# Patient Record
Sex: MALE | Race: WHITE | NOT HISPANIC OR LATINO | Employment: PART TIME | URBAN - METROPOLITAN AREA
[De-identification: names, ages, dates, MRNs, and addresses within clinical notes are randomized per-mention and may not be internally consistent; named-entity substitution may affect disease eponyms.]

---

## 2017-02-27 ENCOUNTER — TELEPHONE (OUTPATIENT)
Dept: OTHER | Facility: CLINIC | Age: 27
End: 2017-02-27

## 2017-02-28 NOTE — TELEPHONE ENCOUNTER
Call Regarding Onboarding [facility]  Attempt 1    Message on voicemail     Comments:       Outreach   Mimi Isidro

## 2017-05-25 NOTE — TELEPHONE ENCOUNTER
5/25/2017    Call Regarding Onboarding [facility] Choices    Attempt 2    Message on voicemail     Comments:       Outreach   KV

## 2017-06-06 NOTE — TELEPHONE ENCOUNTER
6/6/2017    Call Regarding Onboarding are Choices    Attempt 3    Message on voicemail     Comments: 0 dep      Outreach   CC

## 2022-04-19 ENCOUNTER — APPOINTMENT (OUTPATIENT)
Dept: GENERAL RADIOLOGY | Facility: CLINIC | Age: 32
End: 2022-04-19
Attending: EMERGENCY MEDICINE
Payer: COMMERCIAL

## 2022-04-19 ENCOUNTER — HOSPITAL ENCOUNTER (OUTPATIENT)
Facility: CLINIC | Age: 32
Setting detail: OBSERVATION
Discharge: HOME | End: 2022-04-19
Attending: EMERGENCY MEDICINE | Admitting: INTERNAL MEDICINE
Payer: COMMERCIAL

## 2022-04-19 ENCOUNTER — APPOINTMENT (OUTPATIENT)
Dept: CT IMAGING | Facility: CLINIC | Age: 32
End: 2022-04-19
Attending: EMERGENCY MEDICINE
Payer: COMMERCIAL

## 2022-04-19 VITALS
HEIGHT: 67 IN | DIASTOLIC BLOOD PRESSURE: 105 MMHG | BODY MASS INDEX: 24.3 KG/M2 | RESPIRATION RATE: 20 BRPM | HEART RATE: 88 BPM | WEIGHT: 154.8 LBS | SYSTOLIC BLOOD PRESSURE: 135 MMHG | TEMPERATURE: 97.5 F | OXYGEN SATURATION: 97 %

## 2022-04-19 DIAGNOSIS — R06.02 SHORTNESS OF BREATH: ICD-10-CM

## 2022-04-19 DIAGNOSIS — J98.8 NARROWING OF AIRWAY: ICD-10-CM

## 2022-04-19 LAB
ANION GAP SERPL CALCULATED.3IONS-SCNC: 6 MMOL/L (ref 3–14)
BASOPHILS # BLD AUTO: 0.1 10E3/UL (ref 0–0.2)
BASOPHILS NFR BLD AUTO: 2 %
BUN SERPL-MCNC: 8 MG/DL (ref 7–30)
CALCIUM SERPL-MCNC: 7.9 MG/DL (ref 8.5–10.1)
CHLORIDE BLD-SCNC: 112 MMOL/L (ref 94–109)
CO2 SERPL-SCNC: 25 MMOL/L (ref 20–32)
CREAT SERPL-MCNC: 0.8 MG/DL (ref 0.66–1.25)
EOSINOPHIL # BLD AUTO: 0.3 10E3/UL (ref 0–0.7)
EOSINOPHIL NFR BLD AUTO: 8 %
ERYTHROCYTE [DISTWIDTH] IN BLOOD BY AUTOMATED COUNT: 12.8 % (ref 10–15)
GFR SERPL CREATININE-BSD FRML MDRD: >90 ML/MIN/1.73M2
GLUCOSE BLD-MCNC: 97 MG/DL (ref 70–99)
HCT VFR BLD AUTO: 44.9 % (ref 40–53)
HGB BLD-MCNC: 15.2 G/DL (ref 13.3–17.7)
IMM GRANULOCYTES # BLD: 0 10E3/UL
IMM GRANULOCYTES NFR BLD: 1 %
LYMPHOCYTES # BLD AUTO: 0.9 10E3/UL (ref 0.8–5.3)
LYMPHOCYTES NFR BLD AUTO: 26 %
MCH RBC QN AUTO: 32.4 PG (ref 26.5–33)
MCHC RBC AUTO-ENTMCNC: 33.9 G/DL (ref 31.5–36.5)
MCV RBC AUTO: 96 FL (ref 78–100)
MONOCYTES # BLD AUTO: 0.5 10E3/UL (ref 0–1.3)
MONOCYTES NFR BLD AUTO: 14 %
NEUTROPHILS # BLD AUTO: 1.6 10E3/UL (ref 1.6–8.3)
NEUTROPHILS NFR BLD AUTO: 49 %
NRBC # BLD AUTO: 0 10E3/UL
NRBC BLD AUTO-RTO: 0 /100
PLATELET # BLD AUTO: 273 10E3/UL (ref 150–450)
POTASSIUM BLD-SCNC: 3.6 MMOL/L (ref 3.4–5.3)
PROCALCITONIN SERPL-MCNC: <0.05 NG/ML
RBC # BLD AUTO: 4.69 10E6/UL (ref 4.4–5.9)
SARS-COV-2 RNA RESP QL NAA+PROBE: NEGATIVE
SODIUM SERPL-SCNC: 143 MMOL/L (ref 133–144)
WBC # BLD AUTO: 3.3 10E3/UL (ref 4–11)

## 2022-04-19 PROCEDURE — 84145 PROCALCITONIN (PCT): CPT | Performed by: EMERGENCY MEDICINE

## 2022-04-19 PROCEDURE — 85025 COMPLETE CBC W/AUTO DIFF WBC: CPT | Performed by: EMERGENCY MEDICINE

## 2022-04-19 PROCEDURE — C9803 HOPD COVID-19 SPEC COLLECT: HCPCS

## 2022-04-19 PROCEDURE — 96374 THER/PROPH/DIAG INJ IV PUSH: CPT

## 2022-04-19 PROCEDURE — 36415 COLL VENOUS BLD VENIPUNCTURE: CPT | Performed by: EMERGENCY MEDICINE

## 2022-04-19 PROCEDURE — 71046 X-RAY EXAM CHEST 2 VIEWS: CPT

## 2022-04-19 PROCEDURE — 250N000011 HC RX IP 250 OP 636: Performed by: EMERGENCY MEDICINE

## 2022-04-19 PROCEDURE — 250N000009 HC RX 250: Performed by: INTERNAL MEDICINE

## 2022-04-19 PROCEDURE — 99235 HOSP IP/OBS SAME DATE MOD 70: CPT | Performed by: INTERNAL MEDICINE

## 2022-04-19 PROCEDURE — 80048 BASIC METABOLIC PNL TOTAL CA: CPT | Performed by: EMERGENCY MEDICINE

## 2022-04-19 PROCEDURE — 250N000012 HC RX MED GY IP 250 OP 636 PS 637: Performed by: EMERGENCY MEDICINE

## 2022-04-19 PROCEDURE — 70360 X-RAY EXAM OF NECK: CPT

## 2022-04-19 PROCEDURE — U0005 INFEC AGEN DETEC AMPLI PROBE: HCPCS | Performed by: EMERGENCY MEDICINE

## 2022-04-19 PROCEDURE — 250N000012 HC RX MED GY IP 250 OP 636 PS 637: Performed by: INTERNAL MEDICINE

## 2022-04-19 PROCEDURE — G0378 HOSPITAL OBSERVATION PER HR: HCPCS

## 2022-04-19 PROCEDURE — 70491 CT SOFT TISSUE NECK W/DYE: CPT

## 2022-04-19 PROCEDURE — 96375 TX/PRO/DX INJ NEW DRUG ADDON: CPT | Mod: 59

## 2022-04-19 PROCEDURE — 87040 BLOOD CULTURE FOR BACTERIA: CPT | Performed by: EMERGENCY MEDICINE

## 2022-04-19 PROCEDURE — 999N000157 HC STATISTIC RCP TIME EA 10 MIN

## 2022-04-19 PROCEDURE — 94640 AIRWAY INHALATION TREATMENT: CPT

## 2022-04-19 PROCEDURE — 250N000009 HC RX 250: Performed by: EMERGENCY MEDICINE

## 2022-04-19 PROCEDURE — 99285 EMERGENCY DEPT VISIT HI MDM: CPT | Mod: 25

## 2022-04-19 RX ORDER — IPRATROPIUM BROMIDE AND ALBUTEROL SULFATE 2.5; .5 MG/3ML; MG/3ML
3 SOLUTION RESPIRATORY (INHALATION)
Status: DISCONTINUED | OUTPATIENT
Start: 2022-04-19 | End: 2022-04-19 | Stop reason: HOSPADM

## 2022-04-19 RX ORDER — PREDNISONE 20 MG/1
40 TABLET ORAL DAILY
Status: DISCONTINUED | OUTPATIENT
Start: 2022-04-19 | End: 2022-04-19 | Stop reason: HOSPADM

## 2022-04-19 RX ORDER — ACETAMINOPHEN 650 MG/1
650 SUPPOSITORY RECTAL EVERY 6 HOURS PRN
Status: DISCONTINUED | OUTPATIENT
Start: 2022-04-19 | End: 2022-04-19 | Stop reason: HOSPADM

## 2022-04-19 RX ORDER — CEFTRIAXONE 2 G/1
2 INJECTION, POWDER, FOR SOLUTION INTRAMUSCULAR; INTRAVENOUS ONCE
Status: COMPLETED | OUTPATIENT
Start: 2022-04-19 | End: 2022-04-19

## 2022-04-19 RX ORDER — KETOROLAC TROMETHAMINE 30 MG/ML
30 INJECTION, SOLUTION INTRAMUSCULAR; INTRAVENOUS EVERY 6 HOURS PRN
Status: DISCONTINUED | OUTPATIENT
Start: 2022-04-19 | End: 2022-04-19 | Stop reason: HOSPADM

## 2022-04-19 RX ORDER — AMOXICILLIN 250 MG
2 CAPSULE ORAL 2 TIMES DAILY PRN
Status: DISCONTINUED | OUTPATIENT
Start: 2022-04-19 | End: 2022-04-19 | Stop reason: HOSPADM

## 2022-04-19 RX ORDER — POLYETHYLENE GLYCOL 3350 17 G/17G
17 POWDER, FOR SOLUTION ORAL DAILY PRN
Status: DISCONTINUED | OUTPATIENT
Start: 2022-04-19 | End: 2022-04-19 | Stop reason: HOSPADM

## 2022-04-19 RX ORDER — KETOROLAC TROMETHAMINE 15 MG/ML
15 INJECTION, SOLUTION INTRAMUSCULAR; INTRAVENOUS ONCE
Status: COMPLETED | OUTPATIENT
Start: 2022-04-19 | End: 2022-04-19

## 2022-04-19 RX ORDER — ACETAMINOPHEN 325 MG/1
650 TABLET ORAL EVERY 6 HOURS PRN
Status: DISCONTINUED | OUTPATIENT
Start: 2022-04-19 | End: 2022-04-19 | Stop reason: HOSPADM

## 2022-04-19 RX ORDER — PROCHLORPERAZINE 25 MG
25 SUPPOSITORY, RECTAL RECTAL EVERY 12 HOURS PRN
Status: DISCONTINUED | OUTPATIENT
Start: 2022-04-19 | End: 2022-04-19 | Stop reason: HOSPADM

## 2022-04-19 RX ORDER — PREDNISONE 20 MG/1
40 TABLET ORAL DAILY
Qty: 10 TABLET | Refills: 0 | Status: SHIPPED | OUTPATIENT
Start: 2022-04-20 | End: 2022-04-25

## 2022-04-19 RX ORDER — ONDANSETRON 2 MG/ML
4 INJECTION INTRAMUSCULAR; INTRAVENOUS EVERY 6 HOURS PRN
Status: DISCONTINUED | OUTPATIENT
Start: 2022-04-19 | End: 2022-04-19 | Stop reason: HOSPADM

## 2022-04-19 RX ORDER — ALBUTEROL SULFATE 90 UG/1
2 AEROSOL, METERED RESPIRATORY (INHALATION)
Status: DISCONTINUED | OUTPATIENT
Start: 2022-04-19 | End: 2022-04-19 | Stop reason: HOSPADM

## 2022-04-19 RX ORDER — ONDANSETRON 4 MG/1
4 TABLET, ORALLY DISINTEGRATING ORAL EVERY 6 HOURS PRN
Status: DISCONTINUED | OUTPATIENT
Start: 2022-04-19 | End: 2022-04-19 | Stop reason: HOSPADM

## 2022-04-19 RX ORDER — PROCHLORPERAZINE MALEATE 10 MG
10 TABLET ORAL EVERY 6 HOURS PRN
Status: DISCONTINUED | OUTPATIENT
Start: 2022-04-19 | End: 2022-04-19 | Stop reason: HOSPADM

## 2022-04-19 RX ORDER — BISACODYL 10 MG
10 SUPPOSITORY, RECTAL RECTAL DAILY PRN
Status: DISCONTINUED | OUTPATIENT
Start: 2022-04-19 | End: 2022-04-19 | Stop reason: HOSPADM

## 2022-04-19 RX ORDER — IOPAMIDOL 755 MG/ML
100 INJECTION, SOLUTION INTRAVASCULAR ONCE
Status: COMPLETED | OUTPATIENT
Start: 2022-04-19 | End: 2022-04-19

## 2022-04-19 RX ORDER — AMOXICILLIN 250 MG
1 CAPSULE ORAL 2 TIMES DAILY PRN
Status: DISCONTINUED | OUTPATIENT
Start: 2022-04-19 | End: 2022-04-19 | Stop reason: HOSPADM

## 2022-04-19 RX ORDER — LANOLIN ALCOHOL/MO/W.PET/CERES
3 CREAM (GRAM) TOPICAL
Status: DISCONTINUED | OUTPATIENT
Start: 2022-04-19 | End: 2022-04-19 | Stop reason: HOSPADM

## 2022-04-19 RX ORDER — PREDNISONE 20 MG/1
40 TABLET ORAL ONCE
Status: COMPLETED | OUTPATIENT
Start: 2022-04-19 | End: 2022-04-19

## 2022-04-19 RX ADMIN — PREDNISONE 40 MG: 20 TABLET ORAL at 03:34

## 2022-04-19 RX ADMIN — IOPAMIDOL 100 ML: 755 INJECTION, SOLUTION INTRAVENOUS at 04:50

## 2022-04-19 RX ADMIN — PREDNISONE 40 MG: 20 TABLET ORAL at 10:48

## 2022-04-19 RX ADMIN — KETOROLAC TROMETHAMINE 15 MG: 15 INJECTION, SOLUTION INTRAMUSCULAR; INTRAVENOUS at 04:40

## 2022-04-19 RX ADMIN — IPRATROPIUM BROMIDE AND ALBUTEROL SULFATE 3 ML: .5; 3 SOLUTION RESPIRATORY (INHALATION) at 09:54

## 2022-04-19 RX ADMIN — SODIUM CHLORIDE 60 ML: 900 INJECTION INTRAVENOUS at 04:50

## 2022-04-19 RX ADMIN — CEFTRIAXONE SODIUM 2 G: 2 INJECTION, POWDER, FOR SOLUTION INTRAMUSCULAR; INTRAVENOUS at 06:49

## 2022-04-19 ASSESSMENT — ENCOUNTER SYMPTOMS
SORE THROAT: 1
TROUBLE SWALLOWING: 0
RHINORRHEA: 0
SHORTNESS OF BREATH: 1
FEVER: 0

## 2022-04-19 NOTE — PROGRESS NOTES
RECEIVING UNIT ED HANDOFF REVIEW    ED Nurse Handoff Report was reviewed by: Dianne Perez RN on April 19, 2022 at 8:16 AM

## 2022-04-19 NOTE — PROGRESS NOTES
Observation goals  PRIOR TO DISCHARGE        Comments: -diagnostic tests and consults completed and resulted: not met, awaiting ENT consult  -vital signs normal or at patient baseline: partially met, DBP elevated   -tolerating oral intake to maintain hydration: met  -adequate pain control on oral analgesics: met  -returns to baseline functional status: met  -safe disposition plan has been identified: not met    Nurse to notify provider when observation goals have been met and patient is ready for discharge.

## 2022-04-19 NOTE — PROGRESS NOTES
Observation goals  PRIOR TO DISCHARGE        Comments: -diagnostic tests and consults completed and resulted: not met, awaiting ENT consult  -vital signs normal or at patient baseline: met   -tolerating oral intake to maintain hydration: met  -adequate pain control on oral analgesics: met  -returns to baseline functional status: met  -safe disposition plan has been identified: not met    Nurse to notify provider when observation goals have been met and patient is ready for discharge.

## 2022-04-19 NOTE — ED TRIAGE NOTES
Pt reports shortness of breath. History of asthma and using home albuterol inhaler is not improving symptoms.

## 2022-04-19 NOTE — CONSULTS
ENT CONSULT    IMP/PLAN:  Suspect asthma exacerbation is real cause of SOB.  His laryngeal exam is normal on fiberoptic laryngoscopy.  Clinically, normal voice, no stridor or any other sign of upper airway obstruction.  Suspect he may have swallowed or otherwise adducted his vocal cords at the time of the CT scan which made his larynx look normal at the level of the vocal cords.  He does have significant nasal congestion c/w viral URI.  No ENT treatment indicated. No follow up required.    CC: SOB, larynx abnormal on CT neck    HPI: 30 yo male with history of asthma developed tightness in throat and felt he was having difficulty breathing.  He was evaluated plain film of the neck which showed some questionable thickening of the supraglottic structures which led to a CT scan neck which again showed some narrowing at the level of the vocal cords on the CT.  He was admitted to the hospital.  He has had some inhalers for asthma.  He does not have any hoarseness.  He does feel improvement has nasal congestion and has still the tightness sensation of the throat.  No issues with stridor or any significant breathing difficulties.  His pulse ox has been in the high 90s and he has not required any oxygen.    Past Medical History:   Diagnosis Date     Uncomplicated asthma      Current Facility-Administered Medications   Medication     acetaminophen (TYLENOL) tablet 650 mg    Or     acetaminophen (TYLENOL) Suppository 650 mg     albuterol (PROVENTIL HFA/VENTOLIN HFA) inhaler     bisacodyl (DULCOLAX) Suppository 10 mg     ipratropium - albuterol 0.5 mg/2.5 mg/3 mL (DUONEB) neb solution 3 mL     ketorolac (TORADOL) injection 30 mg     melatonin tablet 3 mg     ondansetron (ZOFRAN-ODT) ODT tab 4 mg    Or     ondansetron (ZOFRAN) injection 4 mg     polyethylene glycol (MIRALAX) Packet 17 g     predniSONE (DELTASONE) tablet 40 mg     prochlorperazine (COMPAZINE) injection 10 mg    Or     prochlorperazine (COMPAZINE) tablet 10 mg     "Or     prochlorperazine (COMPAZINE) suppository 25 mg     senna-docusate (SENOKOT-S/PERICOLACE) 8.6-50 MG per tablet 1 tablet    Or     senna-docusate (SENOKOT-S/PERICOLACE) 8.6-50 MG per tablet 2 tablet     sodium phosphate (FLEET ENEMA) 1 enema      No Known Allergies  Social History     Socioeconomic History     Marital status: Single     Spouse name: Not on file     Number of children: Not on file     Years of education: Not on file     Highest education level: Not on file   Occupational History     Not on file   Tobacco Use     Smoking status: Current Every Day Smoker     Smokeless tobacco: Not on file   Substance and Sexual Activity     Alcohol use: Yes     Drug use: Yes     Types: Marijuana     Sexual activity: Not on file   Other Topics Concern     Not on file   Social History Narrative     Not on file     Social Determinants of Health     Financial Resource Strain: Not on file   Food Insecurity: Not on file   Transportation Needs: Not on file   Physical Activity: Not on file   Stress: Not on file   Social Connections: Not on file   Intimate Partner Violence: Not on file   Housing Stability: Not on file     ROS: as above     EXAM  BP (!) 135/105 (BP Location: Left arm)   Pulse 88   Temp 97.5  F (36.4  C) (Oral)   Resp 20   Ht 1.702 m (5' 7\")   Wt 70.2 kg (154 lb 12.8 oz)   SpO2 97%   BMI 24.25 kg/m     Gen: awake alert, conversive, no distress, no oxygen on  Ears: clear TMs bilat  NOSE: bilateral congested with mucosal edema, no purulence  OC/OP: small tonsils, no swelling or redness, fair dentition  Neck: normal, soft, nontender, thryoid larynx normal, no swelling or tenderness    FLEXIBLE FIBEROPTIC LARYNGOSCOPY  Nasal cavities: patent and normal.    Nasopharynx examination: Normal   Base of tongue, Pharyngeal walls, Vallecula and Pyriform Sinuses: Normal  Larynx: Normal, no edema, vocal cords symmetrically mobile  Subglottis and trachea: Normal    Labs/CT reviewed in chart    Thank you,    Alejandro " Castillo Barroso MD  [phone]

## 2022-04-19 NOTE — H&P
[facility]  History and Physical  Hospitalist       Date of Admission:  4/19/2022    Assessment & Plan   Agusto Lui Jr. is a very pleasant 31 year old male with h/o asthma, recovered covid-19 infection Aug 2021 who was admitted on 4/19/2022 with throat pain, shortness of breath and was found to have laryngeal narrowing without obvious abscess.     Laryngeal narrowing  Possible viral infection/inflammation, post covid-19 infection inflammatory changes vs allergy reaction. Neck soft tissue x-ray showed prominence of the epiglottis and aryepiglottic folds with supraglottic airway narrowing.  Neck CT with nonspecific prominence of the aryepiglottic folds and vocal apparatus with associated narrowing of the laryngeal airway.  No definite edema or discrete mass was noted.  Direct inspection was recommended as a next step.  There was no obvious drainable fluid collection or abscess. No obvious offending medication nor smoke/inhalant exposure.   -admit to observation  -prednisone 40 mg daily  -duonebs QID, albuterol as needed  -ENT consultation for possible direct laryngoscopy   -supplemental oxygen if needed (hasn't needed in ED)  -did not continue antibiotic given x 1 in ED  -follow up procalcitonin result    H/o asthma  Has rescue inhaler used 1-2 times a month. Hasn't needed very often until after covid-19 infection.  Chest x-ray clear.  -inhalers/nebs as above    Asymptomatic Rule Out of COVID-19 infection  This patient was evaluated during a global COVID-19 pandemic, which necessitated consideration that the patient might be at risk for infection with the SARS-CoV-2 virus that causes COVID-19. Applicable protocols for evaluation were followed during the patient's care. Low suspicion for infection. No vaccinated. Had covid infection in August 2021.   -follow-up COVID-19 PCR test result => NEGATIVE    Hold all nonessential medications, vitamins, supplements given observation status.   Allow patient to take medications from his home supply if desired. These would need to be reviewed by pharmacy prior to administration.    DVT prophylaxis: Pneumatic Compression Devices and Ambulate every shift  Code Status:  Full    Disposition: Expected discharge in 1-2 days. Care will be taken over by my partner in the morning.     Loren Shukla MD  Text Page    ~~~~~~~~~~~~~~~~~~~~~~~~~~~~~~~~~~~~~~~~~~~~~~~~~~~~~  Primary Care Physician   [facility]    Chief Complaint   Throat pain, shortness of breath    History is obtained from the patient and medical records.    History of Present Illness   Agusto Lui Jr. is a very pleasant 31 year old male with h/o asthma, recovered covid-19 infection Aug 2021 who presents with shortness of breath and difficulty swallowing.  He points to the anterior area of his throat which he says hurts the most.  He has noticed an increased use of his albuterol inhaler, previously going from once or twice per month to now using it about every hour the evening prior to presenting to the emergency department.  No pleuritic chest pain or cough.  Denies any ill contacts.  No new exposures to COVID.  Remains unvaccinated.  Smokes cigarettes and uses marijuana but this is unchanged.  Denies fever, chills, night sweats, runny nose, ear pain.  Has not noticed any wheezing.  Denies any change in the volume or quality of his voice. No hypoxia. No tachycardia or tachypnea. Not needing to use accessory muscles to breathe.     Case reviewed with Dr. Schwartz from the Emergency Department. Labs and available imaging reviewed. Pertinent results include: WBC 3.3k. BMP within normal limits.  Vital signs are all stable.    Past Medical History    I have reviewed this patient's medical history and updated it with pertinent information if needed.   Past Medical History:   Diagnosis Date     Uncomplicated asthma    Recovered covid-19    Past Surgical History   No prior  surgeries    Prior to Admission Medications   Not taking except for ALBUTEROL  Prior to Admission Medications   Prescriptions Last Dose Informant Patient Reported? Taking?   Fluticasone-Salmeterol (ADVAIR HFA IN)   Yes No   LORazepam (ATIVAN) 1 MG tablet   No No   Sig: Take 1 tablet (1 mg) by mouth every 8 hours as needed for anxiety   albuterol (2.5 MG/3ML) 0.083% nebulizer solution   No No   Sig: Take 1 vial (2.5 mg) by nebulization every 6 hours as needed for shortness of breath / dyspnea or wheezing   albuterol (ALBUTEROL) 108 (90 BASE) MCG/ACT inhaler   No No   Sig: Inhale 2 puffs into the lungs every 4 hours as needed for shortness of breath / dyspnea   predniSONE (DELTASONE) 20 MG tablet   No No   Sig: Take two tablets (= 40mg) each day for 5 (five) days      Facility-Administered Medications: None     Allergies   No Known Allergies    Social History   I have reviewed this patient's social history and updated it with pertinent information if needed. Agusto Lui Jr.  reports that he has been smoking. He reports current alcohol use. He reports current drug use. Drug: Marijuana.    Family History   No family history of lung or oral cancers.    Review of Systems   The 10 point Review of Systems is negative other than noted in the HPI or here.    Physical Exam   Temp: 98.3  F (36.8  C) Temp src: Oral BP: 136/80 Pulse: 84   Resp: 20 SpO2: 98 %      Vital Signs with Ranges  Temp:  [98.3  F (36.8  C)] 98.3  F (36.8  C)  Pulse:  [84] 84  Resp:  [20] 20  BP: (136)/(80) 136/80  SpO2:  [98 %] 98 %  140 lbs 0 oz    Constitutional: Alert, NAD, pleasant and interactive  Eyes: PERRL, sclerae anicteric  HEENT: mmm, atraumatic  Respiratory: Lungs CTAB, no wheezes or crackles, no stridor  Cardiovascular: RRR, no murmurs  no LE edema  GI: soft, non-tender, nondistended  Skin/Integument: warm, dry, no acute rashes  Genitourinary: not examined  Musc: YATES, normal limb strength x 4  Neuro: AOx3, no focal deficits, no  tremors  Psych: friendly affect, not anxious, not confused      Data   Data reviewed today:  I personally reviewed: chest xray normal.   Recent Labs   Lab 04/19/22  0438   WBC 3.3*   HGB 15.2   MCV 96         POTASSIUM 3.6   CHLORIDE 112*   CO2 25   BUN 8   CR 0.80   ANIONGAP 6   YAHIR 7.9*   GLC 97       Imaging:  Recent Results (from the past 24 hour(s))   XR Chest 2 Views    Narrative    EXAM: XR CHEST 2 VW  LOCATION: [facility]  DATE/TIME: 4/19/2022 3:29 AM    INDICATION: increased use of albuterol inhaler  COMPARISON: 11/23/2005.      Impression    IMPRESSION: Negative chest.   Neck soft tissue XR    Narrative    EXAM: XR NECK SOFT TISSUE  LOCATION: [facility]  DATE/TIME: 4/19/2022 3:29 AM    INDICATION: Difficulty breathing. Tightness in throat.  COMPARISON: None.  TECHNIQUE: CR Neck Soft Tissue.      Impression    IMPRESSION: Prominence of the epiglottis and aryepiglottic folds with supraglottic airway narrowing. Consider further evaluation with soft tissue neck CT with IV contrast.   Soft tissue neck CT w contrast    Narrative    EXAM: CT SOFT TISSUE NECK W CONTRAST  LOCATION: [facility]  DATE/TIME: 4/19/2022 4:49 AM    INDICATION: Neck abscess, deep tissue.  COMPARISON: None.  CONTRAST: 100mL Isovue 370.  TECHNIQUE: Routine CT Soft Tissue Neck with IV contrast. Multiplanar reformats. Dose reduction techniques were used.    FINDINGS:     MUCOSAL SPACES/SOFT TISSUES: Normal mucosal spaces of the upper aerodigestive tract. No mucosal mass or inflammation identified. The infraglottic trachea is unremarkable. There is nonspecific prominence of the aryepiglottic folds with narrowing of the   airway at the level of the vocal cords. The vocal cords are adducted which degrades evaluation, however, the vocal apparatus also appears to demonstrate nonspecific thickening. No definite edema identified involving the aryepiglottic  folds or vocal   apparatus. The epiglottis is unremarkable. No definite inflammatory changes are noted surrounding the laryngeal soft tissues. Normal parapharyngeal space and subcutaneous soft tissues. Normal oral cavity,  spaces, and floor of mouth structures.    LYMPH NODES: Mildly increased in number and prominent in size left-sided upper cervical chain lymph nodes, nonspecific, but likely reactive.     SALIVARY GLANDS: Normal parotid and submandibular glands.    THYROID: Normal.     VESSELS: Vascular structures of the neck are patent.    VISUALIZED INTRACRANIAL/ORBITS/SINUSES: No abnormality of the visualized intracranial compartment or orbits. Mild scattered paranasal sinus mucosal thickening.    OTHER: No destructive osseous lesion. Two right apical blebs are noted. Otherwise, lungs are clear.      Impression    IMPRESSION:   1.  Nonspecific prominence of the aryepiglottic folds and vocal apparatus with associated narrowing of the laryngeal airway. No definite edema, discrete mass or inflammatory changes noted. Given the narrowing of the laryngeal airway, recommend direct   inspection for further assessment.  2.  No drainable fluid collection or abscess.  3.  Additional findings above.

## 2022-04-19 NOTE — DISCHARGE SUMMARY
[facility]  Hospitalist Discharge Summary      Date of Admission:  4/19/2022  Date of Discharge:  4/19/2022  Discharging Provider: Zeke Santos MD, MD  Discharge Service: Hospitalist Service    Discharge Diagnoses     SOB   Mild asthma exacerbation   Laryngeal narrowing on imaging    Follow-ups Needed After Discharge   Follow-up Appointments     Follow-up and recommended labs and tests       Follow up with primary care provider, [facility],   within 7 days for hospital follow- up.         {  Unresulted Labs Ordered in the Past 30 Days of this Admission     Date and Time Order Name Status Description    4/19/2022  5:51 AM Blood Culture Peripheral Blood Preliminary     4/19/2022  5:51 AM Blood Culture Peripheral Blood Preliminary         Discharge Disposition   Discharged to home  Condition at discharge: Stable      Hospital Course   Agusto Lui Jr. is a very pleasant 31 year old male with h/o asthma, recovered covid-19 infection Aug 2021 who was admitted on 4/19/2022 with throat pain, shortness of breath and was found to have laryngeal narrowing without obvious abscess.   He was admitted under observation and was started on steroid, bronchodilators.  He was also evaluated by ENT due to abnormal CT finding who felt that asthma exacerbation is because of his shortness of breath.  His laryngeal exam was normal on fiberoptic laryngoscopy.  As patient clinically improved he was discharged home with short course of prednisone.  Consultations This Hospital Stay   ENT IP CONSULT    Code Status   Full Code    Time Spent on this Encounter   I, Zeke Santos MD, MD, personally saw the patient today and spent greater than 30 minutes discharging this patient.       Zeke Santos MD, MD  [facility] EXTENDED RECOVERY AND SHORT STAY  [address] [facility] [address] [facility] [address]  Phone:  "[phone]  ______________________________________________________________________    Physical Exam   BP (!) 135/105 (BP Location: Left arm)   Pulse 88   Temp 97.5  F (36.4  C) (Oral)   Resp 20   Ht 1.702 m (5' 7\")   Wt 70.2 kg (154 lb 12.8 oz)   SpO2 97%   BMI 24.25 kg/m    Gen- pleasant lying in bed  HEENT- NAD, URBANO  Neck- supple, no JVD elevation, no thyromegaly  CVS- I+II+ no m/r/g  RS- CTAB  Abdo- soft, no tenderness . No g/r/r   Ext- no edema   CNS- no focal signs     Primary Care Physician   [facility]    Discharge Orders      Reason for your hospital stay    Agusto Faganjeffreyaneta  is a very pleasant 31 year old male with h/o asthma, recovered covid-19 infection Aug 2021 who was admitted on 4/19/2022 with throat pain, shortness of breath and was found to have laryngeal narrowing without obvious abscess. He was evaluated by ENT and found to have no laryngeal abnoramlity     Follow-up and recommended labs and tests     Follow up with primary care provider, [facility], within 7 days for hospital follow- up.     Activity    Your activity upon discharge: activity as tolerated     When to contact your care team    Call your primary doctor if you have any of the following: temperature greater than 100.4 or less than 96 or  increased shortness of breath.     Diet    Follow this diet upon discharge: Orders Placed This Encounter      Regular Diet Adult       Significant Results and Procedures   Results for orders placed or performed during the hospital encounter of 04/19/22   XR Chest 2 Views    Narrative    EXAM: XR CHEST 2 VW  LOCATION: [facility]  DATE/TIME: 4/19/2022 3:29 AM    INDICATION: increased use of albuterol inhaler  COMPARISON: 11/23/2005.      Impression    IMPRESSION: Negative chest.   Neck soft tissue XR    Narrative    EXAM: XR NECK SOFT TISSUE  LOCATION: [facility]  DATE/TIME: 4/19/2022 3:29 " AM    INDICATION: Difficulty breathing. Tightness in throat.  COMPARISON: None.  TECHNIQUE: CR Neck Soft Tissue.      Impression    IMPRESSION: Prominence of the epiglottis and aryepiglottic folds with supraglottic airway narrowing. Consider further evaluation with soft tissue neck CT with IV contrast.   Soft tissue neck CT w contrast    Narrative    EXAM: CT SOFT TISSUE NECK W CONTRAST  LOCATION: [facility]  DATE/TIME: 4/19/2022 4:49 AM    INDICATION: Neck abscess, deep tissue.  COMPARISON: None.  CONTRAST: 100mL Isovue 370.  TECHNIQUE: Routine CT Soft Tissue Neck with IV contrast. Multiplanar reformats. Dose reduction techniques were used.    FINDINGS:     MUCOSAL SPACES/SOFT TISSUES: Normal mucosal spaces of the upper aerodigestive tract. No mucosal mass or inflammation identified. The infraglottic trachea is unremarkable. There is nonspecific prominence of the aryepiglottic folds with narrowing of the   airway at the level of the vocal cords. The vocal cords are adducted which degrades evaluation, however, the vocal apparatus also appears to demonstrate nonspecific thickening. No definite edema identified involving the aryepiglottic folds or vocal   apparatus. The epiglottis is unremarkable. No definite inflammatory changes are noted surrounding the laryngeal soft tissues. Normal parapharyngeal space and subcutaneous soft tissues. Normal oral cavity,  spaces, and floor of mouth structures.    LYMPH NODES: Mildly increased in number and prominent in size left-sided upper cervical chain lymph nodes, nonspecific, but likely reactive.     SALIVARY GLANDS: Normal parotid and submandibular glands.    THYROID: Normal.     VESSELS: Vascular structures of the neck are patent.    VISUALIZED INTRACRANIAL/ORBITS/SINUSES: No abnormality of the visualized intracranial compartment or orbits. Mild scattered paranasal sinus mucosal thickening.    OTHER: No destructive osseous lesion. Two right  apical blebs are noted. Otherwise, lungs are clear.      Impression    IMPRESSION:   1.  Nonspecific prominence/thickening of the aryepiglottic folds and vocal apparatus with associated narrowing of the laryngeal airway. No definite edema, discrete mass or inflammatory changes noted. Given the narrowing/stenosis of the laryngeal airway,   recommend direct inspection to exclude underlying pathology.   2.  No drainable fluid collection or abscess.  3.  Additional findings above.                Discharge Medications   Current Discharge Medication List      START taking these medications    Details   predniSONE (DELTASONE) 20 MG tablet Take 2 tablets (40 mg) by mouth daily for 5 days  Qty: 10 tablet, Refills: 0    Associated Diagnoses: Shortness of breath         CONTINUE these medications which have NOT CHANGED    Details   albuterol (ALBUTEROL) 108 (90 BASE) MCG/ACT inhaler Inhale 2 puffs into the lungs every 4 hours as needed for shortness of breath / dyspnea  Qty: 1 Inhaler, Refills: 0           Allergies   No Known Allergies

## 2022-04-19 NOTE — PHARMACY-ADMISSION MEDICATION HISTORY
Pharmacy Medication History  Admission medication history interview status for the 4/19/2022  admission is complete. See EPIC admission navigator for prior to admission medications     Location of Interview: Patient room  Medication history sources: Patient and Surescripts    Significant changes made to the medication list:  Per patient he is only taking as need albuterol inhaler     In the past week, patient estimated taking medication this percent of the time: greater than 90%    Additional medication history information:       Medication reconciliation completed by provider prior to medication history? Yes    Time spent in this activity: 5min     Prior to Admission medications    Medication Sig Last Dose Taking? Auth Provider   albuterol (ALBUTEROL) 108 (90 BASE) MCG/ACT inhaler Inhale 2 puffs into the lungs every 4 hours as needed for shortness of breath / dyspnea   Marcus Laws, DO   ALBUTEROL IN    Reported, Patient       The information provided in this note is only as accurate as the sources available at the time of update(s)   Octavia Puri PharmD

## 2022-04-19 NOTE — PLAN OF CARE
Orientation/Cognitive: A&Ox4  Observation Goals (Met/ Not Met): not met  Mobility Level/Assist Equipment: Ind  Fall Risk (Y/N): none  Behavior Concerns: none  Pain Management: denies pain  Tele/VS/O2: VSS, RA.  ABNL Lab/BG: WBC 3.3, XR- see chart  Diet: regular- tolerating  Bowel/Bladder: continent  Skin Concerns: none  Drains/Devices: PIV SL.   Tests/Procedures for next shift: none  Anticipated DC date & active delays: today    Pt feeling much better this evening. Denies SOB with ambulation in halls and 94% on RA after activity. Cleared by ENT. Discharge instructions given to patient. Discharge home this evening.

## 2022-04-19 NOTE — ED NOTES
"[facility]  ED Nurse Handoff Report    ED Chief complaint: Shortness of Breath      ED Diagnosis:   Final diagnoses:   Narrowing of airway   Shortness of breath       Code Status: Full Code    Allergies: No Known Allergies    Patient Story: Pt reports shortness of breath. History of asthma and using home albuterol inhaler is not improving symptoms  Focused Assessment:  Patient is alert and oriented x 4, calm and cooperative. VS are stable, skin is warm and dry, respirations are even and non-labored on room air. Patient denied chest pain, shortness of breath, dizziness, and nausea. Complains of generally not feeling well and throat discomfort.      Treatments and/or interventions provided:   Medications   cefTRIAXone (ROCEPHIN) 2 g vial to attach to  ml bag for ADULTS or NS 50 ml bag for PEDS (has no administration in time range)   predniSONE (DELTASONE) tablet 40 mg (40 mg Oral Given 4/19/22 0334)   ketorolac (TORADOL) injection 15 mg (15 mg Intravenous Given 4/19/22 0440)   iopamidol (ISOVUE-370) solution 100 mL (100 mLs Intravenous Given 4/19/22 0450)   Saline (60 mLs Intravenous Given 4/19/22 0450)       Patient's response to treatments and/or interventions: Stable    To be done/followed up on inpatient unit:  Monitor    Does this patient have any cognitive concerns?: None    Activity level - Baseline/Home:  Independent  Activity Level - Current:   Independent    Patient's Preferred language: English   Needed?: No    Isolation: None  Infection: Not Applicable  Patient tested for COVID 19 prior to admission: YES  Bariatric?: No    Vital Signs:   Vitals:    04/19/22 0316   BP: 136/80   Pulse: 84   Resp: 20   Temp: 98.3  F (36.8  C)   TempSrc: Oral   SpO2: 98%   Weight: 63.5 kg (140 lb)   Height: 1.727 m (5' 8\")       Cardiac Rhythm:     Was the PSS-3 completed:   Yes  What interventions are required if any?               Family Comments: significant other is at the bedside  OBS " brochure/video discussed/provided to patient/family: Yes              Name of person given brochure if not patient: NA              Relationship to patient: NA    For the majority of the shift this patient's behavior was Green.   Behavioral interventions performed were  information and reassurance provided.  .    ED NURSE PHONE NUMBER: [phone]

## 2022-04-19 NOTE — ED PROVIDER NOTES
"  History   Chief Complaint:  Shortness of Breath       HPI   Agusto uLi Jr. is a 31 year old male with history of asthma and COVID-19 who presents with shortness of breath and throat pain. Patient had COVID last August and has noticed decreased lung functions ever since. He has had episodes of shortness of breath on and off for the past months and has been using his albuterol inhaler once per month. His shortness of breath has worsened in the last couple of week, and he has been using his inhaler every day. He states that he was supposed to use it every 4 hours but he used it every hour tonight with no relief. He states that his lungs do not hurt but feels the air does not go down his throat fully. He reports a throat pain. He denies trouble swallowing or trouble moving his neck. Patient has been on Prednisone before. No fever or sneezing. He states that his room is dry sometimes. He denies smoke exposure. Patient denies any allergies or medications.    Review of Systems   Constitutional: Negative for fever.   HENT: Positive for sore throat. Negative for rhinorrhea and trouble swallowing.    Respiratory: Positive for shortness of breath.    All other systems reviewed and are negative.    Allergies:  Denies Allergies    Medications:  Denies medications.    Past Medical History:     Uncomplicated asthma      Past Surgical History:    No past surgical history on file.     Family History:    No family history on file.    Social History:  Presents accompanied.     Physical Exam     Patient Vitals for the past 24 hrs:   BP Temp Temp src Pulse Resp SpO2 Height Weight   04/19/22 0316 136/80 98.3  F (36.8  C) Oral 84 20 98 % 1.727 m (5' 8\") 63.5 kg (140 lb)       Physical Exam  Eyes:               Sclera white; Pupils are equal and round  ENT:                External ears and nares normal  CV:                  Rate as above with regular rhythm   Resp:               Diminished in bases, prolonged expiratory phase    "                       Non-labored, no retractions or accessory muscle use  GI:                   Abdomen is soft, non-tender, non-distended                          No rebound tenderness or peritoneal features  MS:                  Moves all extremities  Skin:                Warm and dry  Neuro:             Speech is normal and fluent. No apparent deficit.    Emergency Department Course     Imaging:  Soft tissue neck CT w contrast   Preliminary Result   IMPRESSION:    1.  Nonspecific prominence of the aryepiglottic folds and vocal apparatus with associated narrowing of the laryngeal airway. No definite edema, discrete mass or inflammatory changes noted. Given the narrowing of the laryngeal airway, recommend direct    inspection for further assessment.   2.  No drainable fluid collection or abscess.   3.  Additional findings above.                    Neck soft tissue XR   Final Result   IMPRESSION: Prominence of the epiglottis and aryepiglottic folds with supraglottic airway narrowing. Consider further evaluation with soft tissue neck CT with IV contrast.      XR Chest 2 Views   Final Result   IMPRESSION: Negative chest.        Report per radiology    Laboratory:  Labs Ordered and Resulted from Time of ED Arrival to Time of ED Departure   BASIC METABOLIC PANEL - Abnormal       Result Value    Sodium 143      Potassium 3.6      Chloride 112 (*)     Carbon Dioxide (CO2) 25      Anion Gap 6      Urea Nitrogen 8      Creatinine 0.80      Calcium 7.9 (*)     Glucose 97      GFR Estimate >90     CBC WITH PLATELETS AND DIFFERENTIAL - Abnormal    WBC Count 3.3 (*)     RBC Count 4.69      Hemoglobin 15.2      Hematocrit 44.9      MCV 96      MCH 32.4      MCHC 33.9      RDW 12.8      Platelet Count 273      % Neutrophils 49      % Lymphocytes 26      % Monocytes 14      % Eosinophils 8      % Basophils 2      % Immature Granulocytes 1      NRBCs per 100 WBC 0      Absolute Neutrophils 1.6      Absolute Lymphocytes 0.9       Absolute Monocytes 0.5      Absolute Eosinophils 0.3      Absolute Basophils 0.1      Absolute Immature Granulocytes 0.0      Absolute NRBCs 0.0     PROCALCITONIN   COVID-19 VIRUS (CORONAVIRUS) BY PCR   BLOOD CULTURE   BLOOD CULTURE        Emergency Department Course:     Reviewed:  I reviewed nursing notes, vitals, past medical history and Care Everywhere    Assessments:  0320 I obtained history and examined the patient as noted above.   0537 I rechecked the patient and explained findings.     Consults:  0600 I spoke to Dr. Shukla who accepted the patient for admission.    Interventions:  0334 Prednisone 40mg po  0440 Toradol 15mg IV  0555 Rocephin 2g IV    Disposition:  The patient was admitted to the hospital under the care of Dr. Shukla.     Impression & Plan     Medical Decision Making:  Patient is not wheezing on exam and has continued shortness of breath.  With history of having an inhaler and it resulting in some symptoms improvement, prednisone given.  However presentation without wheezing remains concerning for other etiologies.  X-ray chest and neck followed up with CT neck which was abnormal as above.  IV antibiotics to cover infectious etiologies.  Admitted.  Will benefit from laryngoscopy.    Diagnosis:    ICD-10-CM    1. Narrowing of airway  J98.8    2. Shortness of breath  R06.02        Scribe Disclosure:  I, Marga Valerio, am serving as a scribe at 3:21 AM on 4/19/2022 to document services personally performed by Ana Schwartz MD based on my observations and the provider's statements to me.            Ana Schwartz MD  04/22/22 0039

## 2022-04-24 LAB
BACTERIA BLD CULT: NO GROWTH
BACTERIA BLD CULT: NO GROWTH

## 2022-10-23 ENCOUNTER — HEALTH MAINTENANCE LETTER (OUTPATIENT)
Age: 32
End: 2022-10-23

## 2023-03-10 ENCOUNTER — HOSPITAL ENCOUNTER (EMERGENCY)
Facility: CLINIC | Age: 33
Discharge: HOME | End: 2023-03-10
Attending: EMERGENCY MEDICINE | Admitting: EMERGENCY MEDICINE
Payer: COMMERCIAL

## 2023-03-10 ENCOUNTER — APPOINTMENT (OUTPATIENT)
Dept: GENERAL RADIOLOGY | Facility: CLINIC | Age: 33
End: 2023-03-10
Attending: EMERGENCY MEDICINE
Payer: COMMERCIAL

## 2023-03-10 VITALS
BODY MASS INDEX: 24.33 KG/M2 | DIASTOLIC BLOOD PRESSURE: 85 MMHG | OXYGEN SATURATION: 94 % | RESPIRATION RATE: 22 BRPM | SYSTOLIC BLOOD PRESSURE: 121 MMHG | WEIGHT: 155 LBS | HEART RATE: 95 BPM | TEMPERATURE: 97.8 F | HEIGHT: 67 IN

## 2023-03-10 DIAGNOSIS — J45.901 EXACERBATION OF PERSISTENT ASTHMA, UNSPECIFIED ASTHMA SEVERITY: ICD-10-CM

## 2023-03-10 LAB
FLUAV RNA SPEC QL NAA+PROBE: NEGATIVE
FLUBV RNA RESP QL NAA+PROBE: NEGATIVE
RSV RNA SPEC NAA+PROBE: NEGATIVE
SARS-COV-2 RNA RESP QL NAA+PROBE: NEGATIVE

## 2023-03-10 PROCEDURE — C9803 HOPD COVID-19 SPEC COLLECT: HCPCS

## 2023-03-10 PROCEDURE — 250N000013 HC RX MED GY IP 250 OP 250 PS 637: Performed by: EMERGENCY MEDICINE

## 2023-03-10 PROCEDURE — 94640 AIRWAY INHALATION TREATMENT: CPT

## 2023-03-10 PROCEDURE — 87637 SARSCOV2&INF A&B&RSV AMP PRB: CPT | Performed by: EMERGENCY MEDICINE

## 2023-03-10 PROCEDURE — 71046 X-RAY EXAM CHEST 2 VIEWS: CPT

## 2023-03-10 PROCEDURE — 250N000012 HC RX MED GY IP 250 OP 636 PS 637: Performed by: EMERGENCY MEDICINE

## 2023-03-10 PROCEDURE — 99284 EMERGENCY DEPT VISIT MOD MDM: CPT | Mod: 25,CS

## 2023-03-10 RX ORDER — ALBUTEROL SULFATE 90 UG/1
6 AEROSOL, METERED RESPIRATORY (INHALATION) ONCE
Status: COMPLETED | OUTPATIENT
Start: 2023-03-10 | End: 2023-03-10

## 2023-03-10 RX ORDER — DOXYCYCLINE 100 MG/1
100 CAPSULE ORAL 2 TIMES DAILY
Qty: 10 CAPSULE | Refills: 0 | Status: SHIPPED | OUTPATIENT
Start: 2023-03-10 | End: 2023-03-15

## 2023-03-10 RX ORDER — PREDNISONE 20 MG/1
TABLET ORAL
Qty: 10 TABLET | Refills: 0 | Status: SHIPPED | OUTPATIENT
Start: 2023-03-10

## 2023-03-10 RX ORDER — PREDNISONE 20 MG/1
40 TABLET ORAL ONCE
Status: COMPLETED | OUTPATIENT
Start: 2023-03-10 | End: 2023-03-10

## 2023-03-10 RX ORDER — ALBUTEROL SULFATE 90 UG/1
2 AEROSOL, METERED RESPIRATORY (INHALATION) EVERY 4 HOURS PRN
Qty: 18 G | Refills: 0 | Status: SHIPPED | OUTPATIENT
Start: 2023-03-10

## 2023-03-10 RX ADMIN — ALBUTEROL SULFATE 6 PUFF: 90 AEROSOL, METERED RESPIRATORY (INHALATION) at 12:10

## 2023-03-10 RX ADMIN — PREDNISONE 40 MG: 20 TABLET ORAL at 12:09

## 2023-03-10 ASSESSMENT — ENCOUNTER SYMPTOMS
MYALGIAS: 0
CHILLS: 0
SHORTNESS OF BREATH: 1
WHEEZING: 1
FEVER: 0

## 2023-03-10 ASSESSMENT — ACTIVITIES OF DAILY LIVING (ADL): ADLS_ACUITY_SCORE: 35

## 2023-03-10 NOTE — ED TRIAGE NOTES
Patient started to experience some shortness of breath and a cough that started a couple days ago. Patient has a history of asthma and is taking his Singulair and albuterol inhalers.      Triage Assessment     Row Name 03/10/23 1125       Respiratory WDL    Respiratory WDL cough;expansion/retractions;rhythm/pattern    Rhythm/Pattern, Respiratory shortness of breath    Expansion/Accessory Muscles/Retractions abdominal muscle use    Cough Frequency infrequent       Skin Circulation/Temperature WDL    Skin Circulation/Temperature WDL temperature    Skin Temperature warm       Peripheral/Neurovascular WDL    Peripheral Neurovascular WDL WDL       Cognitive/Neuro/Behavioral WDL    Cognitive/Neuro/Behavioral WDL WDL

## 2023-03-10 NOTE — ED PROVIDER NOTES
"  History     Chief Complaint:  Shortness of Breath       The history is provided by the patient.      Agusto Lui Jr. is a 32 year old male with a history of asthma who presents with shortness of breath. The patient reports that he has experienced wheezing and nasal congestion for the last three days. He has used an albuterol inhaler without relief of his symptoms. He has used the albuterol inhaler every two hours. He denies experiencing fever, chills, body aches, or known sick contacts. He reports that he did smoke during his teen years and early twenties.     Independent Historian:   None - Patient Only    Review of External Notes: Reviewed prior hospitalization about 1 year ago for asthma/similar symptoms.      ROS:  Review of Systems   Constitutional: Negative for chills and fever.   HENT: Positive for congestion and postnasal drip.    Respiratory: Positive for shortness of breath and wheezing.    Musculoskeletal: Negative for myalgias.   All other systems reviewed and are negative.    Allergies:  No known drug allergies    Medications:    Albuterol inhaler    Past Medical History:    Narrowing of airway  Asthma, uncomplicated    Social History:  The patient presents to the ED with his mother.   They arrived via private vehicle.  Preferred Pharmacy: [facility] on [address].  PCP: [facility] [address] Family     Physical Exam     Patient Vitals for the past 24 hrs:   BP Temp Temp src Pulse Resp SpO2 Height Weight   03/10/23 1132 -- -- -- -- 22 -- -- --   03/10/23 1124 121/85 97.8  F (36.6  C) Oral 95 -- 94 % 1.702 m (5' 7\") 70.3 kg (155 lb)        Physical Exam  Nursing note and vitals reviewed.  HENT:   Mouth/Throat: Moist mucous membranes.   Eyes: EOMI, nonicteric sclera  Cardiovascular: Normal rate, regular rhythm, no murmurs, rubs, or gallops  Pulmonary/Chest: Effort normal. Diffuse bilateral wheezing. No rales.   Musculoskeletal: Normal range of motion.   Neurological: Alert. Moves all " extremities spontaneously.   Skin: Skin is warm and dry. No rash noted.     Emergency Department Course     Imaging:  Chest XR,  PA & LAT   Final Result   IMPRESSION: Since April 19, 2022, heart size remains normal. No   pleural effusion, pneumothorax, or abnormal area of consolidation.      MEIR MIN MD            SYSTEM ID:  [id number]         Report per radiology  CXR independently reviewed - no infiltrate/PTX.     Laboratory:  Labs Ordered and Resulted from Time of ED Arrival to Time of ED Departure   INFLUENZA A/B, RSV, & SARS-COV2 PCR - Normal       Result Value    Influenza A PCR Negative      Influenza B PCR Negative      RSV PCR Negative      SARS CoV2 PCR Negative          Emergency Department Course & Assessments:       Interventions:  Medications   predniSONE (DELTASONE) tablet 40 mg (40 mg Oral $Given 3/10/23 1209)   albuterol (PROVENTIL HFA/VENTOLIN HFA) inhaler (6 puffs Inhalation $Given 3/10/23 1210)        Assessments:  1155 I obtained history and examined the patient.  1344 I rechecked the patient, explained findings, and prepared for discharge.    Independent Interpretation (X-rays, CTs, rhythm strip):  Imaging independently reviewed and agree with radiologist interpretation.     Consultations/Discussion of Management or Tests:  None        Social Determinants of Health affecting care:   None    Disposition:  The patient was discharged to home.     Impression & Plan      Medical Decision Making:  Pt presents for evaluation of shortness of breath and wheezing.  Signs and symptoms are consistent with asthma exacerbation.  A broad differential was considered including foreign body, asthma, pneumonia, bronchitis, reactive airway disease, pneumothorax, cardiac equivalent, viral induced wheezing, allergic phenomena, etc. He feels improved after interventions here in ED.  There are no signs at this point of any serious etiologies including those mentioned above.  No indication for hospitalization at  this time including no hypoxia, no marked increase in respiratory rate, minimal to no retractions.   Supportive outpatient management is indicated, medications for discharge noted above.  Close followup with primary care physician.  Return if increased wheezing, progressive shortness of breath, develops fever greater than 102.        Diagnosis:    ICD-10-CM    1. Exacerbation of persistent asthma, unspecified asthma severity  J45.901            Discharge Medications:  New Prescriptions    DOXYCYCLINE HYCLATE (VIBRAMYCIN) 100 MG CAPSULE    Take 1 capsule (100 mg) by mouth 2 times daily for 5 days    PREDNISONE (DELTASONE) 20 MG TABLET    Take two tablets (= 40mg) each day for 5 (five) days        Scribe Disclosure:  Sylvia AMEZCUA, am serving as a scribe at 11:59 AM on 3/10/2023 to document services personally performed by Yvon Streeter MD based on my observations and the provider's statements to me.        Yvon Streeter MD  03/10/23 9829

## 2023-08-27 ENCOUNTER — HEALTH MAINTENANCE LETTER (OUTPATIENT)
Age: 33
End: 2023-08-27

## 2025-05-04 ENCOUNTER — HEALTH MAINTENANCE LETTER (OUTPATIENT)
Age: 35
End: 2025-05-04